# Patient Record
Sex: FEMALE | Race: WHITE | NOT HISPANIC OR LATINO | Employment: FULL TIME | URBAN - METROPOLITAN AREA
[De-identification: names, ages, dates, MRNs, and addresses within clinical notes are randomized per-mention and may not be internally consistent; named-entity substitution may affect disease eponyms.]

---

## 2021-04-09 ENCOUNTER — TELEPHONE (OUTPATIENT)
Dept: GASTROENTEROLOGY | Facility: CLINIC | Age: 61
End: 2021-04-09

## 2021-04-22 RX ORDER — SODIUM, POTASSIUM,MAG SULFATES 17.5-3.13G
2 SOLUTION, RECONSTITUTED, ORAL ORAL TAKE AS DIRECTED
Qty: 354 ML | Refills: 0 | Status: CANCELLED | OUTPATIENT
Start: 2021-04-22

## 2021-04-26 ENCOUNTER — TELEPHONE (OUTPATIENT)
Dept: GASTROENTEROLOGY | Facility: CLINIC | Age: 61
End: 2021-04-26

## 2021-04-26 ENCOUNTER — APPOINTMENT (OUTPATIENT)
Dept: PREADMISSION TESTING | Facility: HOSPITAL | Age: 61
End: 2021-04-26

## 2021-04-26 LAB — SARS-COV-2 RNA PNL SPEC NAA+PROBE: NOT DETECTED

## 2021-04-26 PROCEDURE — C9803 HOPD COVID-19 SPEC COLLECT: HCPCS

## 2021-04-26 PROCEDURE — U0004 COV-19 TEST NON-CDC HGH THRU: HCPCS

## 2021-04-26 NOTE — TELEPHONE ENCOUNTER
JE  MRS. PORRAS CALLED AND SAID SOMEONE CALLED RE; COLON WITH DR JETT  SHE HAD COVID TEST THIS MORNING. I CONFIRMED HER APPT WAS Thursday. IF SOMETHING HAS CHANGED PLEASE ALL PATIENT.

## 2021-04-29 ENCOUNTER — OUTSIDE FACILITY SERVICE (OUTPATIENT)
Dept: GASTROENTEROLOGY | Facility: CLINIC | Age: 61
End: 2021-04-29

## 2021-04-29 PROCEDURE — 45385 COLONOSCOPY W/LESION REMOVAL: CPT | Performed by: INTERNAL MEDICINE

## 2021-05-23 ENCOUNTER — APPOINTMENT (OUTPATIENT)
Dept: CT IMAGING | Facility: HOSPITAL | Age: 61
End: 2021-05-23

## 2021-05-23 ENCOUNTER — HOSPITAL ENCOUNTER (EMERGENCY)
Facility: HOSPITAL | Age: 61
Discharge: HOME | End: 2021-05-23
Attending: EMERGENCY MEDICINE | Admitting: EMERGENCY MEDICINE

## 2021-05-23 VITALS
HEART RATE: 86 BPM | OXYGEN SATURATION: 98 % | HEIGHT: 65 IN | SYSTOLIC BLOOD PRESSURE: 125 MMHG | WEIGHT: 120 LBS | TEMPERATURE: 98.5 F | BODY MASS INDEX: 19.99 KG/M2 | RESPIRATION RATE: 20 BRPM | DIASTOLIC BLOOD PRESSURE: 73 MMHG

## 2021-05-23 DIAGNOSIS — N12 PYELONEPHRITIS: ICD-10-CM

## 2021-05-23 DIAGNOSIS — R10.9 ACUTE ABDOMINAL PAIN: Primary | ICD-10-CM

## 2021-05-23 LAB
ALBUMIN SERPL-MCNC: 3.8 G/DL (ref 3.5–5.2)
ALBUMIN/GLOB SERPL: 1.2 G/DL
ALP SERPL-CCNC: 76 U/L (ref 39–117)
ALT SERPL W P-5'-P-CCNC: 31 U/L (ref 1–33)
ANION GAP SERPL CALCULATED.3IONS-SCNC: 11 MMOL/L (ref 5–15)
AST SERPL-CCNC: 22 U/L (ref 1–32)
BACTERIA UR QL AUTO: ABNORMAL /HPF
BASOPHILS # BLD AUTO: 0.03 10*3/MM3 (ref 0–0.2)
BASOPHILS NFR BLD AUTO: 0.3 % (ref 0–1.5)
BILIRUB SERPL-MCNC: 0.4 MG/DL (ref 0–1.2)
BILIRUB UR QL STRIP: NEGATIVE
BUN SERPL-MCNC: 10 MG/DL (ref 8–23)
BUN/CREAT SERPL: 14.1 (ref 7–25)
CALCIUM SPEC-SCNC: 9.9 MG/DL (ref 8.6–10.5)
CHLORIDE SERPL-SCNC: 94 MMOL/L (ref 98–107)
CLARITY UR: CLEAR
CO2 SERPL-SCNC: 28 MMOL/L (ref 22–29)
COLOR UR: YELLOW
CREAT SERPL-MCNC: 0.71 MG/DL (ref 0.57–1)
D-LACTATE SERPL-SCNC: 0.9 MMOL/L (ref 0.5–2)
DEPRECATED RDW RBC AUTO: 42.2 FL (ref 37–54)
EOSINOPHIL # BLD AUTO: 0.1 10*3/MM3 (ref 0–0.4)
EOSINOPHIL NFR BLD AUTO: 1.1 % (ref 0.3–6.2)
ERYTHROCYTE [DISTWIDTH] IN BLOOD BY AUTOMATED COUNT: 11.6 % (ref 12.3–15.4)
GFR SERPL CREATININE-BSD FRML MDRD: 84 ML/MIN/1.73
GLOBULIN UR ELPH-MCNC: 3.2 GM/DL
GLUCOSE SERPL-MCNC: 109 MG/DL (ref 65–99)
GLUCOSE UR STRIP-MCNC: NEGATIVE MG/DL
HCT VFR BLD AUTO: 32 % (ref 34–46.6)
HGB BLD-MCNC: 11.6 G/DL (ref 12–15.9)
HGB UR QL STRIP.AUTO: ABNORMAL
HOLD SPECIMEN: NORMAL
HYALINE CASTS UR QL AUTO: ABNORMAL /LPF
IMM GRANULOCYTES # BLD AUTO: 0.04 10*3/MM3 (ref 0–0.05)
IMM GRANULOCYTES NFR BLD AUTO: 0.4 % (ref 0–0.5)
KETONES UR QL STRIP: NEGATIVE
LEUKOCYTE ESTERASE UR QL STRIP.AUTO: ABNORMAL
LIPASE SERPL-CCNC: 48 U/L (ref 13–60)
LYMPHOCYTES # BLD AUTO: 0.76 10*3/MM3 (ref 0.7–3.1)
LYMPHOCYTES NFR BLD AUTO: 8.2 % (ref 19.6–45.3)
MCH RBC QN AUTO: 35.8 PG (ref 26.6–33)
MCHC RBC AUTO-ENTMCNC: 36.3 G/DL (ref 31.5–35.7)
MCV RBC AUTO: 98.8 FL (ref 79–97)
MONOCYTES # BLD AUTO: 0.8 10*3/MM3 (ref 0.1–0.9)
MONOCYTES NFR BLD AUTO: 8.6 % (ref 5–12)
NEUTROPHILS NFR BLD AUTO: 7.55 10*3/MM3 (ref 1.7–7)
NEUTROPHILS NFR BLD AUTO: 81.4 % (ref 42.7–76)
NITRITE UR QL STRIP: NEGATIVE
NRBC BLD AUTO-RTO: 0 /100 WBC (ref 0–0.2)
PH UR STRIP.AUTO: 7 [PH] (ref 5–8)
PLATELET # BLD AUTO: 201 10*3/MM3 (ref 140–450)
PMV BLD AUTO: 8.9 FL (ref 6–12)
POTASSIUM SERPL-SCNC: 4.1 MMOL/L (ref 3.5–5.2)
PROT SERPL-MCNC: 7 G/DL (ref 6–8.5)
PROT UR QL STRIP: NEGATIVE
RBC # BLD AUTO: 3.24 10*6/MM3 (ref 3.77–5.28)
RBC # UR: ABNORMAL /HPF
REF LAB TEST METHOD: ABNORMAL
SODIUM SERPL-SCNC: 133 MMOL/L (ref 136–145)
SP GR UR STRIP: 1.01 (ref 1–1.03)
SQUAMOUS #/AREA URNS HPF: ABNORMAL /HPF
UROBILINOGEN UR QL STRIP: ABNORMAL
WBC # BLD AUTO: 9.28 10*3/MM3 (ref 3.4–10.8)
WBC UR QL AUTO: ABNORMAL /HPF
WHOLE BLOOD HOLD SPECIMEN: NORMAL
WHOLE BLOOD HOLD SPECIMEN: NORMAL

## 2021-05-23 PROCEDURE — 25010000002 KETOROLAC TROMETHAMINE PER 15 MG: Performed by: EMERGENCY MEDICINE

## 2021-05-23 PROCEDURE — 81001 URINALYSIS AUTO W/SCOPE: CPT | Performed by: EMERGENCY MEDICINE

## 2021-05-23 PROCEDURE — 25010000002 CEFTRIAXONE PER 250 MG: Performed by: EMERGENCY MEDICINE

## 2021-05-23 PROCEDURE — 74176 CT ABD & PELVIS W/O CONTRAST: CPT

## 2021-05-23 PROCEDURE — 85025 COMPLETE CBC W/AUTO DIFF WBC: CPT

## 2021-05-23 PROCEDURE — 87186 SC STD MICRODIL/AGAR DIL: CPT | Performed by: EMERGENCY MEDICINE

## 2021-05-23 PROCEDURE — 87086 URINE CULTURE/COLONY COUNT: CPT | Performed by: EMERGENCY MEDICINE

## 2021-05-23 PROCEDURE — 87077 CULTURE AEROBIC IDENTIFY: CPT | Performed by: EMERGENCY MEDICINE

## 2021-05-23 PROCEDURE — 99283 EMERGENCY DEPT VISIT LOW MDM: CPT

## 2021-05-23 PROCEDURE — 83605 ASSAY OF LACTIC ACID: CPT | Performed by: EMERGENCY MEDICINE

## 2021-05-23 PROCEDURE — 80053 COMPREHEN METABOLIC PANEL: CPT | Performed by: EMERGENCY MEDICINE

## 2021-05-23 PROCEDURE — 25010000002 HYDROMORPHONE PER 4 MG: Performed by: EMERGENCY MEDICINE

## 2021-05-23 PROCEDURE — 25010000002 ONDANSETRON PER 1 MG: Performed by: EMERGENCY MEDICINE

## 2021-05-23 PROCEDURE — 83690 ASSAY OF LIPASE: CPT | Performed by: EMERGENCY MEDICINE

## 2021-05-23 PROCEDURE — 96365 THER/PROPH/DIAG IV INF INIT: CPT

## 2021-05-23 PROCEDURE — 96375 TX/PRO/DX INJ NEW DRUG ADDON: CPT

## 2021-05-23 RX ORDER — ASPIRIN 81 MG/1
81 TABLET, CHEWABLE ORAL DAILY
COMMUNITY

## 2021-05-23 RX ORDER — MULTIPLE VITAMINS W/ MINERALS TAB 9MG-400MCG
1 TAB ORAL DAILY
COMMUNITY

## 2021-05-23 RX ORDER — KETOROLAC TROMETHAMINE 15 MG/ML
15 INJECTION, SOLUTION INTRAMUSCULAR; INTRAVENOUS ONCE
Status: COMPLETED | OUTPATIENT
Start: 2021-05-23 | End: 2021-05-23

## 2021-05-23 RX ORDER — OXYCODONE HYDROCHLORIDE AND ACETAMINOPHEN 5; 325 MG/1; MG/1
1 TABLET ORAL EVERY 6 HOURS PRN
Qty: 8 TABLET | Refills: 0 | Status: SHIPPED | OUTPATIENT
Start: 2021-05-23

## 2021-05-23 RX ORDER — SODIUM CHLORIDE 9 MG/ML
10 INJECTION INTRAVENOUS AS NEEDED
Status: DISCONTINUED | OUTPATIENT
Start: 2021-05-23 | End: 2021-05-23 | Stop reason: HOSPADM

## 2021-05-23 RX ORDER — ONDANSETRON 2 MG/ML
4 INJECTION INTRAMUSCULAR; INTRAVENOUS ONCE
Status: COMPLETED | OUTPATIENT
Start: 2021-05-23 | End: 2021-05-23

## 2021-05-23 RX ORDER — HYDROMORPHONE HYDROCHLORIDE 1 MG/ML
0.5 INJECTION, SOLUTION INTRAMUSCULAR; INTRAVENOUS; SUBCUTANEOUS ONCE
Status: COMPLETED | OUTPATIENT
Start: 2021-05-23 | End: 2021-05-23

## 2021-05-23 RX ORDER — CEFUROXIME AXETIL 500 MG/1
500 TABLET ORAL 2 TIMES DAILY
Qty: 20 TABLET | Refills: 0 | Status: SHIPPED | OUTPATIENT
Start: 2021-05-23

## 2021-05-23 RX ORDER — CETIRIZINE HYDROCHLORIDE 10 MG/1
10 TABLET ORAL DAILY
COMMUNITY

## 2021-05-23 RX ADMIN — SODIUM CHLORIDE 1000 ML: 9 INJECTION, SOLUTION INTRAVENOUS at 13:57

## 2021-05-23 RX ADMIN — HYDROMORPHONE HYDROCHLORIDE 0.5 MG: 1 INJECTION, SOLUTION INTRAMUSCULAR; INTRAVENOUS; SUBCUTANEOUS at 13:59

## 2021-05-23 RX ADMIN — KETOROLAC TROMETHAMINE 15 MG: 15 INJECTION, SOLUTION INTRAMUSCULAR; INTRAVENOUS at 17:14

## 2021-05-23 RX ADMIN — ONDANSETRON 4 MG: 2 INJECTION INTRAMUSCULAR; INTRAVENOUS at 13:58

## 2021-05-23 RX ADMIN — SODIUM CHLORIDE 1 G: 900 INJECTION INTRAVENOUS at 16:35

## 2021-05-23 NOTE — DISCHARGE INSTRUCTIONS
No drive while taking the pain medicine I have prescribed.  Return if high fever, worsening pain, vomiting, or any other concerns.  Otherwise call your primary care provider tomorrow and arrange follow-up this week with her.  Drink extra fluids

## 2021-05-23 NOTE — ED PROVIDER NOTES
Subjective   Mrs. Godinez presents with low abdominal pain.  Is been present for the last 3 days.  It is constant.  It is worsened with sitting and certain movements.  She has decreased appetite.  She reports that her bowel movements have been abnormal with frequent loose stools.  She tells me this feels like prior episodes of diverticulitis.  This morning however the pain moved to her right lower quadrant.  She denies nausea or vomiting.  She has had chills but no fever.  She has had no prior abdominal surgeries.      History provided by:  Patient  Abdominal Pain  Pain location:  RLQ  Pain quality: dull    Pain radiates to:  L flank  Pain severity:  Moderate  Onset quality:  Gradual  Timing:  Constant  Progression:  Worsening  Chronicity:  New  Relieved by:  Nothing  Worsened by:  Movement  Associated symptoms: anorexia and nausea    Associated symptoms: no chest pain, no constipation, no cough, no dysuria, no fever, no hematuria, no shortness of breath and no vomiting        Review of Systems   Constitutional: Negative for fever.   HENT: Negative for congestion and rhinorrhea.    Respiratory: Negative for cough and shortness of breath.    Cardiovascular: Negative for chest pain.   Gastrointestinal: Positive for abdominal pain, anorexia and nausea. Negative for constipation and vomiting.   Genitourinary: Negative for dysuria and hematuria.   Musculoskeletal: Negative for back pain.   All other systems reviewed and are negative.      History reviewed. No pertinent past medical history.    No Known Allergies    Past Surgical History:   Procedure Laterality Date   • HYSTERECTOMY     • KNEE ARTHROSCOPY Bilateral        History reviewed. No pertinent family history.    Social History     Socioeconomic History   • Marital status:      Spouse name: Not on file   • Number of children: Not on file   • Years of education: Not on file   • Highest education level: Not on file   Tobacco Use   • Smoking status: Never  Smoker   • Smokeless tobacco: Never Used   Substance and Sexual Activity   • Alcohol use: Yes     Alcohol/week: 7.0 standard drinks     Types: 7 Glasses of wine per week   • Drug use: Never           Objective   Physical Exam  Vitals and nursing note reviewed.   Constitutional:       General: She is not in acute distress.     Appearance: She is well-developed.   HENT:      Head: Normocephalic and atraumatic.   Eyes:      General: No scleral icterus.  Cardiovascular:      Rate and Rhythm: Normal rate and regular rhythm.      Heart sounds: No murmur heard.   No friction rub.   Pulmonary:      Effort: Pulmonary effort is normal.      Breath sounds: Normal breath sounds. No wheezing or rales.   Abdominal:      General: Abdomen is flat. Bowel sounds are normal. There is no distension.      Palpations: Abdomen is soft.      Tenderness: There is abdominal tenderness in the right lower quadrant. There is no guarding or rebound.   Skin:     General: Skin is warm and dry.      Capillary Refill: Capillary refill takes less than 2 seconds.      Coloration: Skin is not pale.   Neurological:      General: No focal deficit present.      Mental Status: She is alert and oriented to person, place, and time.      Motor: No weakness.         Procedures           ED Course  ED Course as of May 23 2107   Sun May 23, 2021   1352 I discussed pain medication and she would like to have some.  She tells me she does fine with morphine.  I have ordered Dilaudid    [DT]   1657 Reviewed Mrs. Godinez's CAT scan as well as her labs.  This is most consistent with urinary tract infection and pyelonephritis.  Have given IV antibiotics.  Her  will drive her home.  We will give a dose of Toradol prior to discharge.  We will place her on antibiotics.  And prescribe a small amount of pain medication    [DT]      ED Course User Index  [DT] Ata Matamoros MD                                           MDM  Number of Diagnoses or Management  Options  Acute abdominal pain: new and requires workup  Pyelonephritis: new and requires workup     Amount and/or Complexity of Data Reviewed  Clinical lab tests: ordered and reviewed  Tests in the radiology section of CPT®: ordered and reviewed  Review and summarize past medical records: yes  Independent visualization of images, tracings, or specimens: yes    Patient Progress  Patient progress: improved      Final diagnoses:   Acute abdominal pain   Pyelonephritis       ED Disposition  ED Disposition     ED Disposition Condition Comment    Discharge Stable           Luly Morgan MD  [address] [facility]  , [address]  [place] [address]  [phone]               Medication List      New Prescriptions    cefuroxime 500 MG tablet  Commonly known as: CEFTIN  Take 1 tablet by mouth 2 (Two) Times a Day.     oxyCODONE-acetaminophen 5-325 MG per tablet  Commonly known as: PERCOCET  Take 1 tablet by mouth Every 6 (Six) Hours As Needed (Pain).        Stop    polyethylene glycol 236 g solution  Commonly known as: GoLYTELY           Where to Get Your Medications      These medications were sent to [id number] - [place], [place] - [address] AT [facility] - [phone] [facility] [phone] [place] [address] [address] [facility] [address]    Phone: [phone]   · cefuroxime 500 MG tablet  · oxyCODONE-acetaminophen 5-325 MG per tablet          Ata Matamoros MD  05/23/21 9126

## 2021-05-25 LAB — BACTERIA SPEC AEROBE CULT: ABNORMAL

## 2023-11-22 ENCOUNTER — APPOINTMENT (OUTPATIENT)
Dept: CT IMAGING | Facility: HOSPITAL | Age: 63
End: 2023-11-22
Payer: COMMERCIAL

## 2023-11-22 ENCOUNTER — APPOINTMENT (OUTPATIENT)
Dept: MRI IMAGING | Facility: HOSPITAL | Age: 63
End: 2023-11-22
Payer: COMMERCIAL

## 2023-11-22 ENCOUNTER — HOSPITAL ENCOUNTER (OUTPATIENT)
Facility: HOSPITAL | Age: 63
Setting detail: OBSERVATION
Discharge: HOME | End: 2023-11-23
Attending: EMERGENCY MEDICINE | Admitting: INTERNAL MEDICINE
Payer: COMMERCIAL

## 2023-11-22 ENCOUNTER — APPOINTMENT (OUTPATIENT)
Dept: GENERAL RADIOLOGY | Facility: HOSPITAL | Age: 63
End: 2023-11-22
Payer: COMMERCIAL

## 2023-11-22 DIAGNOSIS — R41.82 ALTERED MENTAL STATUS, UNSPECIFIED ALTERED MENTAL STATUS TYPE: Primary | ICD-10-CM

## 2023-11-22 PROBLEM — R79.89 ELEVATED LACTIC ACID LEVEL: Status: ACTIVE | Noted: 2023-11-22

## 2023-11-22 PROBLEM — F41.8 ANXIETY ASSOCIATED WITH DEPRESSION: Chronic | Status: ACTIVE | Noted: 2023-11-22

## 2023-11-22 PROBLEM — R56.9 OBSERVED SEIZURE-LIKE ACTIVITY: Status: ACTIVE | Noted: 2023-11-22

## 2023-11-22 PROBLEM — E78.5 HLD (HYPERLIPIDEMIA): Chronic | Status: ACTIVE | Noted: 2023-11-22

## 2023-11-22 PROBLEM — I10 PRIMARY HYPERTENSION: Chronic | Status: ACTIVE | Noted: 2023-11-22

## 2023-11-22 PROBLEM — J30.2 SEASONAL ALLERGIC RHINITIS: Status: ACTIVE | Noted: 2023-08-18

## 2023-11-22 PROBLEM — K57.92 DIVERTICULITIS: Status: ACTIVE | Noted: 2023-08-18

## 2023-11-22 PROBLEM — E03.9 HYPOTHYROIDISM (ACQUIRED): Chronic | Status: ACTIVE | Noted: 2023-11-22

## 2023-11-22 PROBLEM — N17.9 AKI (ACUTE KIDNEY INJURY): Status: ACTIVE | Noted: 2023-11-22

## 2023-11-22 LAB
ALBUMIN SERPL-MCNC: 4.7 G/DL (ref 3.5–5.2)
ALBUMIN/GLOB SERPL: 2 G/DL
ALP SERPL-CCNC: 48 U/L (ref 39–117)
ALT SERPL W P-5'-P-CCNC: 85 U/L (ref 1–33)
AMPHET+METHAMPHET UR QL: NEGATIVE
AMPHETAMINES UR QL: NEGATIVE
ANION GAP SERPL CALCULATED.3IONS-SCNC: 13 MMOL/L (ref 5–15)
AST SERPL-CCNC: 85 U/L (ref 1–32)
B PARAPERT DNA SPEC QL NAA+PROBE: NOT DETECTED
B PERT DNA SPEC QL NAA+PROBE: NOT DETECTED
BARBITURATES UR QL SCN: NEGATIVE
BASOPHILS # BLD AUTO: 0.04 10*3/MM3 (ref 0–0.2)
BASOPHILS NFR BLD AUTO: 0.7 % (ref 0–1.5)
BENZODIAZ UR QL SCN: NEGATIVE
BILIRUB SERPL-MCNC: 0.5 MG/DL (ref 0–1.2)
BILIRUB UR QL STRIP: NEGATIVE
BUN SERPL-MCNC: 16 MG/DL (ref 8–23)
BUN/CREAT SERPL: 14.5 (ref 7–25)
BUPRENORPHINE SERPL-MCNC: NEGATIVE NG/ML
C PNEUM DNA NPH QL NAA+NON-PROBE: NOT DETECTED
CALCIUM SPEC-SCNC: 10.7 MG/DL (ref 8.6–10.5)
CANNABINOIDS SERPL QL: NEGATIVE
CHLORIDE SERPL-SCNC: 95 MMOL/L (ref 98–107)
CLARITY UR: ABNORMAL
CO2 SERPL-SCNC: 28 MMOL/L (ref 22–29)
COCAINE UR QL: NEGATIVE
COLOR UR: YELLOW
CREAT SERPL-MCNC: 1.1 MG/DL (ref 0.57–1)
D-LACTATE SERPL-SCNC: 1.6 MMOL/L (ref 0.5–2)
D-LACTATE SERPL-SCNC: 4.4 MMOL/L (ref 0.5–2)
DEPRECATED RDW RBC AUTO: 45.7 FL (ref 37–54)
EGFRCR SERPLBLD CKD-EPI 2021: 56.2 ML/MIN/1.73
EOSINOPHIL # BLD AUTO: 0.19 10*3/MM3 (ref 0–0.4)
EOSINOPHIL NFR BLD AUTO: 3.3 % (ref 0.3–6.2)
ERYTHROCYTE [DISTWIDTH] IN BLOOD BY AUTOMATED COUNT: 12 % (ref 12.3–15.4)
ETHANOL BLD-MCNC: <10 MG/DL (ref 0–10)
FENTANYL UR-MCNC: NEGATIVE NG/ML
FLUAV SUBTYP SPEC NAA+PROBE: NOT DETECTED
FLUBV RNA ISLT QL NAA+PROBE: NOT DETECTED
GEN 5 2HR TROPONIN T REFLEX: <6 NG/L
GLOBULIN UR ELPH-MCNC: 2.4 GM/DL
GLUCOSE SERPL-MCNC: 176 MG/DL (ref 65–99)
GLUCOSE UR STRIP-MCNC: ABNORMAL MG/DL
HADV DNA SPEC NAA+PROBE: NOT DETECTED
HCOV 229E RNA SPEC QL NAA+PROBE: NOT DETECTED
HCOV HKU1 RNA SPEC QL NAA+PROBE: NOT DETECTED
HCOV NL63 RNA SPEC QL NAA+PROBE: NOT DETECTED
HCOV OC43 RNA SPEC QL NAA+PROBE: NOT DETECTED
HCT VFR BLD AUTO: 33.7 % (ref 34–46.6)
HGB BLD-MCNC: 12.1 G/DL (ref 12–15.9)
HGB UR QL STRIP.AUTO: NEGATIVE
HMPV RNA NPH QL NAA+NON-PROBE: NOT DETECTED
HPIV1 RNA ISLT QL NAA+PROBE: NOT DETECTED
HPIV2 RNA SPEC QL NAA+PROBE: NOT DETECTED
HPIV3 RNA NPH QL NAA+PROBE: NOT DETECTED
HPIV4 P GENE NPH QL NAA+PROBE: NOT DETECTED
IMM GRANULOCYTES # BLD AUTO: 0.03 10*3/MM3 (ref 0–0.05)
IMM GRANULOCYTES NFR BLD AUTO: 0.5 % (ref 0–0.5)
KETONES UR QL STRIP: NEGATIVE
LEUKOCYTE ESTERASE UR QL STRIP.AUTO: NEGATIVE
LIPASE SERPL-CCNC: 59 U/L (ref 13–60)
LYMPHOCYTES # BLD AUTO: 0.57 10*3/MM3 (ref 0.7–3.1)
LYMPHOCYTES NFR BLD AUTO: 10 % (ref 19.6–45.3)
M PNEUMO IGG SER IA-ACNC: NOT DETECTED
MCH RBC QN AUTO: 37.5 PG (ref 26.6–33)
MCHC RBC AUTO-ENTMCNC: 35.9 G/DL (ref 31.5–35.7)
MCV RBC AUTO: 104.3 FL (ref 79–97)
METHADONE UR QL SCN: NEGATIVE
MONOCYTES # BLD AUTO: 0.29 10*3/MM3 (ref 0.1–0.9)
MONOCYTES NFR BLD AUTO: 5.1 % (ref 5–12)
NEUTROPHILS NFR BLD AUTO: 4.56 10*3/MM3 (ref 1.7–7)
NEUTROPHILS NFR BLD AUTO: 80.4 % (ref 42.7–76)
NITRITE UR QL STRIP: NEGATIVE
NRBC BLD AUTO-RTO: 0 /100 WBC (ref 0–0.2)
OPIATES UR QL: NEGATIVE
OXYCODONE UR QL SCN: NEGATIVE
PCP UR QL SCN: NEGATIVE
PH UR STRIP.AUTO: 6.5 [PH] (ref 5–8)
PLATELET # BLD AUTO: 200 10*3/MM3 (ref 140–450)
PMV BLD AUTO: 9.3 FL (ref 6–12)
POTASSIUM SERPL-SCNC: 3.8 MMOL/L (ref 3.5–5.2)
PROT SERPL-MCNC: 7.1 G/DL (ref 6–8.5)
PROT UR QL STRIP: NEGATIVE
RBC # BLD AUTO: 3.23 10*6/MM3 (ref 3.77–5.28)
RHINOVIRUS RNA SPEC NAA+PROBE: NOT DETECTED
RSV RNA NPH QL NAA+NON-PROBE: NOT DETECTED
SARS-COV-2 RNA NPH QL NAA+NON-PROBE: NOT DETECTED
SODIUM SERPL-SCNC: 136 MMOL/L (ref 136–145)
SP GR UR STRIP: 1.02 (ref 1–1.03)
TRICYCLICS UR QL SCN: NEGATIVE
TROPONIN T DELTA: NORMAL
TROPONIN T SERPL HS-MCNC: <6 NG/L
TSH SERPL DL<=0.05 MIU/L-ACNC: 1.16 UIU/ML (ref 0.27–4.2)
UROBILINOGEN UR QL STRIP: ABNORMAL
WBC NRBC COR # BLD AUTO: 5.68 10*3/MM3 (ref 3.4–10.8)

## 2023-11-22 PROCEDURE — A9577 INJ MULTIHANCE: HCPCS | Performed by: INTERNAL MEDICINE

## 2023-11-22 PROCEDURE — 25810000003 SODIUM CHLORIDE 0.9 % SOLUTION: Performed by: NURSE PRACTITIONER

## 2023-11-22 PROCEDURE — 83690 ASSAY OF LIPASE: CPT | Performed by: EMERGENCY MEDICINE

## 2023-11-22 PROCEDURE — G0378 HOSPITAL OBSERVATION PER HR: HCPCS

## 2023-11-22 PROCEDURE — 93005 ELECTROCARDIOGRAM TRACING: CPT | Performed by: EMERGENCY MEDICINE

## 2023-11-22 PROCEDURE — 83605 ASSAY OF LACTIC ACID: CPT | Performed by: EMERGENCY MEDICINE

## 2023-11-22 PROCEDURE — 82077 ASSAY SPEC XCP UR&BREATH IA: CPT | Performed by: EMERGENCY MEDICINE

## 2023-11-22 PROCEDURE — 84484 ASSAY OF TROPONIN QUANT: CPT | Performed by: EMERGENCY MEDICINE

## 2023-11-22 PROCEDURE — 99221 1ST HOSP IP/OBS SF/LOW 40: CPT | Performed by: INTERNAL MEDICINE

## 2023-11-22 PROCEDURE — 0 GADOBENATE DIMEGLUMINE 529 MG/ML SOLUTION: Performed by: INTERNAL MEDICINE

## 2023-11-22 PROCEDURE — 70450 CT HEAD/BRAIN W/O DYE: CPT

## 2023-11-22 PROCEDURE — 70553 MRI BRAIN STEM W/O & W/DYE: CPT

## 2023-11-22 PROCEDURE — 0202U NFCT DS 22 TRGT SARS-COV-2: CPT | Performed by: EMERGENCY MEDICINE

## 2023-11-22 PROCEDURE — 80307 DRUG TEST PRSMV CHEM ANLYZR: CPT | Performed by: EMERGENCY MEDICINE

## 2023-11-22 PROCEDURE — 80050 GENERAL HEALTH PANEL: CPT | Performed by: EMERGENCY MEDICINE

## 2023-11-22 PROCEDURE — 81003 URINALYSIS AUTO W/O SCOPE: CPT | Performed by: EMERGENCY MEDICINE

## 2023-11-22 PROCEDURE — 96361 HYDRATE IV INFUSION ADD-ON: CPT

## 2023-11-22 PROCEDURE — 71045 X-RAY EXAM CHEST 1 VIEW: CPT

## 2023-11-22 PROCEDURE — 25810000003 SODIUM CHLORIDE 0.9 % SOLUTION: Performed by: EMERGENCY MEDICINE

## 2023-11-22 PROCEDURE — 36415 COLL VENOUS BLD VENIPUNCTURE: CPT

## 2023-11-22 PROCEDURE — 99284 EMERGENCY DEPT VISIT MOD MDM: CPT

## 2023-11-22 RX ORDER — SODIUM CHLORIDE 0.9 % (FLUSH) 0.9 %
10 SYRINGE (ML) INJECTION EVERY 12 HOURS SCHEDULED
Status: DISCONTINUED | OUTPATIENT
Start: 2023-11-22 | End: 2023-11-23 | Stop reason: HOSPADM

## 2023-11-22 RX ORDER — POLYETHYLENE GLYCOL 3350 17 G/17G
17 POWDER, FOR SOLUTION ORAL DAILY PRN
Status: DISCONTINUED | OUTPATIENT
Start: 2023-11-22 | End: 2023-11-23 | Stop reason: HOSPADM

## 2023-11-22 RX ORDER — SODIUM CHLORIDE 9 MG/ML
40 INJECTION, SOLUTION INTRAVENOUS AS NEEDED
Status: DISCONTINUED | OUTPATIENT
Start: 2023-11-22 | End: 2023-11-23 | Stop reason: HOSPADM

## 2023-11-22 RX ORDER — MIDAZOLAM HYDROCHLORIDE 1 MG/ML
2.5 INJECTION INTRAMUSCULAR; INTRAVENOUS EVERY 4 HOURS PRN
Status: DISCONTINUED | OUTPATIENT
Start: 2023-11-22 | End: 2023-11-23 | Stop reason: HOSPADM

## 2023-11-22 RX ORDER — SODIUM CHLORIDE 0.9 % (FLUSH) 0.9 %
10 SYRINGE (ML) INJECTION AS NEEDED
Status: DISCONTINUED | OUTPATIENT
Start: 2023-11-22 | End: 2023-11-23 | Stop reason: HOSPADM

## 2023-11-22 RX ORDER — NICOTINE 21 MG/24HR
1 PATCH, TRANSDERMAL 24 HOURS TRANSDERMAL
Status: DISCONTINUED | OUTPATIENT
Start: 2023-11-22 | End: 2023-11-23 | Stop reason: HOSPADM

## 2023-11-22 RX ORDER — ACETAMINOPHEN 325 MG/1
650 TABLET ORAL EVERY 4 HOURS PRN
Status: DISCONTINUED | OUTPATIENT
Start: 2023-11-22 | End: 2023-11-23 | Stop reason: HOSPADM

## 2023-11-22 RX ORDER — ACETAMINOPHEN 650 MG/1
650 SUPPOSITORY RECTAL EVERY 4 HOURS PRN
Status: DISCONTINUED | OUTPATIENT
Start: 2023-11-22 | End: 2023-11-23 | Stop reason: HOSPADM

## 2023-11-22 RX ORDER — BISACODYL 5 MG/1
5 TABLET, DELAYED RELEASE ORAL DAILY PRN
Status: DISCONTINUED | OUTPATIENT
Start: 2023-11-22 | End: 2023-11-23 | Stop reason: HOSPADM

## 2023-11-22 RX ORDER — ASPIRIN 81 MG/1
81 TABLET, CHEWABLE ORAL DAILY
Status: DISCONTINUED | OUTPATIENT
Start: 2023-11-23 | End: 2023-11-23 | Stop reason: HOSPADM

## 2023-11-22 RX ORDER — BISACODYL 10 MG
10 SUPPOSITORY, RECTAL RECTAL DAILY PRN
Status: DISCONTINUED | OUTPATIENT
Start: 2023-11-22 | End: 2023-11-23 | Stop reason: HOSPADM

## 2023-11-22 RX ORDER — MULTIPLE VITAMINS W/ MINERALS TAB 9MG-400MCG
1 TAB ORAL DAILY
Status: DISCONTINUED | OUTPATIENT
Start: 2023-11-23 | End: 2023-11-23 | Stop reason: HOSPADM

## 2023-11-22 RX ORDER — SODIUM CHLORIDE 9 MG/ML
75 INJECTION, SOLUTION INTRAVENOUS CONTINUOUS
Status: ACTIVE | OUTPATIENT
Start: 2023-11-22 | End: 2023-11-23

## 2023-11-22 RX ORDER — ACETAMINOPHEN 160 MG/5ML
650 SOLUTION ORAL EVERY 4 HOURS PRN
Status: DISCONTINUED | OUTPATIENT
Start: 2023-11-22 | End: 2023-11-23 | Stop reason: HOSPADM

## 2023-11-22 RX ORDER — AMOXICILLIN 250 MG
1 CAPSULE ORAL 2 TIMES DAILY
Status: DISCONTINUED | OUTPATIENT
Start: 2023-11-22 | End: 2023-11-23 | Stop reason: HOSPADM

## 2023-11-22 RX ADMIN — SODIUM CHLORIDE 1000 ML: 9 INJECTION, SOLUTION INTRAVENOUS at 15:12

## 2023-11-22 RX ADMIN — Medication 1 PATCH: at 18:29

## 2023-11-22 RX ADMIN — ACETAMINOPHEN 650 MG: 325 TABLET ORAL at 22:30

## 2023-11-22 RX ADMIN — SODIUM CHLORIDE 75 ML/HR: 9 INJECTION, SOLUTION INTRAVENOUS at 18:31

## 2023-11-22 RX ADMIN — ACETAMINOPHEN 650 MG: 325 TABLET ORAL at 18:29

## 2023-11-22 RX ADMIN — GADOBENATE DIMEGLUMINE 12 ML: 529 INJECTION, SOLUTION INTRAVENOUS at 22:18

## 2023-11-22 NOTE — Clinical Note
Level of Care: Telemetry [5]   Diagnosis: AMS (altered mental status) [2155337]   Admitting Physician: KALA NICHOLSON [[id number]]   Attending Physician: KALA NICHOLSON [[id number]]

## 2023-11-22 NOTE — H&P
"    [facility] Medicine Services  HISTORY AND PHYSICAL    Patient Name: Mirna Godinez  : 1960  MRN: [id number]  Primary Care Physician: Lizett Anguiano APRN  Date of admission: 2023    Subjective   Subjective     Chief Complaint:  AMS, seizure like activity     HPI:  Mirna Godinez is a 63 y.o. female with past medical history significant for hypothyroidism, HTN, anxiety, depression.  Patient has been in normal state of health.  Was at work today sitting at her desk.  Approximately 1710 PM states she felt nauseated and then suddenly felt like a \"switch flipped\". Had a witnessed episode lasting 1-2 minutes of staring, \"shaking\", and questionable foaming at the mouth.  EMS notified.  Patient has no recollection of the events until ER.  Shortly oriented x 3 but slow to respond.  Proved and now simply reports feeling sleepy.  Initial lactate of 4.4, reflex 1.6.  CT head with no acute finding.  Mild leo.  ER spoke with neurology.  Dr. Tripathi recommends no AE medications for now, seizure precautions, MRI brain with and without and EEG.  Will admit to hospital medicine service    Review of Systems   Constitutional:  Positive for activity change and fatigue. Negative for fever.   HENT: Negative.     Eyes: Negative.    Respiratory: Negative.     Cardiovascular: Negative.    Gastrointestinal: Negative.    Genitourinary: Negative.    Musculoskeletal: Negative.    Skin: Negative.    Allergic/Immunologic: Negative.    Neurological:  Positive for tremors, seizures and speech difficulty.        Seizure like activity    Hematological: Negative.    Psychiatric/Behavioral:  The patient is nervous/anxious.           Personal History     Past Medical History:   Diagnosis Date    Anxiety associated with depression 2023    HLD (hyperlipidemia) 2023    Hypothyroidism (acquired) 2023    Primary hypertension 2023         Past Surgical History:   Procedure Laterality Date    " HYSTERECTOMY      KNEE ARTHROSCOPY Bilateral        Family History:  family history includes Bipolar disorder in her mother; Diabetes in her father; Heart disease in her father; No Known Problems in her daughter.     Social History:  reports that she quit smoking about 2 years ago. Her smoking use included cigarettes. She has never used smokeless tobacco. She reports current alcohol use of about 7.0 standard drinks of alcohol per week. She reports that she does not use drugs.  Social History     Social History Narrative    .  Lives with . No hh, oxygen or dme use.     Reports frequent not daily alcohol use, but it depends on the day.    Medications:  Lisinopril, PARoxetine HCl, aspirin, cetirizine, multivitamin with minerals, and oxyCODONE-acetaminophen    Allergies   Allergen Reactions    Ceclor [Cefaclor] Hives       Objective   Objective     Vital Signs:   Temp:  [98.3 °F (36.8 °C)-98.7 °F (37.1 °C)] 98.3 °F (36.8 °C)  Heart Rate:  [] 93  Resp:  [18] 18  BP: (131-159)/(64-91) 145/84  Flow (L/min):  [1] 1    Physical Exam   Constitutional: Awake, alert.  No acute distress.  Resting up in bed in ER.   at bedside.  Eyes: PERRLA, sclerae anicteric, no conjunctival injection  HENT: NCAT, mucous membranes moist  Neck: Supple, no thyromegaly, no lymphadenopathy, trachea midline  Respiratory: Clear to auscultation bilaterally, nonlabored respirations room air with sats 100%.  Cardiovascular: RRR, no murmurs, rubs, or gallops, palpable pedal pulses bilaterally  Gastrointestinal: Positive bowel sounds, soft, nontender, nondistended  Musculoskeletal: No bilateral ankle edema, no clubbing or cyanosis to extremities.  Dailey spontaneously.  Psychiatric: Appropriate affect, cooperative  Neurologic: Oriented x 3, strength symmetric in all extremities, Cranial Nerves grossly intact to confrontation, speech clear.  Follows commands.  Skin: No rashes      Result Review:  I have personally reviewed the  results from the time of this admission to 11/22/2023 23:46 EST and agree with these findings:  [x]  Laboratory list / accordion  []  Microbiology  [x]  Radiology  [x]  EKG/Telemetry   []  Cardiology/Vascular   []  Pathology  [x]  Old records  []  Other:  Most notable findings include: CT has calcified corpus collosum anteriorly and fair amount of atrophy.    LAB RESULTS:      Lab 11/22/23  1726 11/22/23  1450   WBC  --  5.68   HEMOGLOBIN  --  12.1   HEMATOCRIT  --  33.7*   PLATELETS  --  200   NEUTROS ABS  --  4.56   IMMATURE GRANS (ABS)  --  0.03   LYMPHS ABS  --  0.57*   MONOS ABS  --  0.29   EOS ABS  --  0.19   MCV  --  104.3*   LACTATE 1.6 4.4*         Lab 11/22/23  1726 11/22/23  1450   SODIUM  --  136   POTASSIUM  --  3.8   CHLORIDE  --  95*   CO2  --  28.0   ANION GAP  --  13.0   BUN  --  16   CREATININE  --  1.10*   EGFR  --  56.2*   GLUCOSE  --  176*   CALCIUM  --  10.7*   TSH 1.160  --          Lab 11/22/23  1450   TOTAL PROTEIN 7.1   ALBUMIN 4.7   GLOBULIN 2.4   ALT (SGPT) 85*   AST (SGOT) 85*   BILIRUBIN 0.5   ALK PHOS 48   LIPASE 59         Lab 11/22/23  1726 11/22/23  1450   HSTROP T <6 <6                 Brief Urine Lab Results  (Last result in the past 365 days)        Color   Clarity   Blood   Leuk Est   Nitrite   Protein   CREAT   Urine HCG        11/22/23 1624 Yellow   Hazy   Negative   Negative   Negative   Negative                 Microbiology Results (last 10 days)       Procedure Component Value - Date/Time    COVID PRE-OP / PRE-PROCEDURE SCREENING ORDER (NO ISOLATION) - Swab, Nasopharynx [[id number]]  (Normal) Collected: 11/22/23 1439    Lab Status: Final result Specimen: Swab from Nasopharynx Updated: 11/22/23 1546    Narrative:      The following orders were created for panel order COVID PRE-OP / PRE-PROCEDURE SCREENING ORDER (NO ISOLATION) - Swab, Nasopharynx.  Procedure                               Abnormality         Status                     ---------                                -----------         ------                     Respiratory Panel PCR w/...[[id number]]  Normal              Final result                 Please view results for these tests on the individual orders.    Respiratory Panel PCR w/COVID-19(SARS-CoV-2) TRAE/GORDO/JANIE/PAD/COR/VANNESSA In-House, NP Swab in UTM/VTM, 2 HR TAT - Swab, Nasopharynx [[id number]]  (Normal) Collected: 11/22/23 1439    Lab Status: Final result Specimen: Swab from Nasopharynx Updated: 11/22/23 3004     ADENOVIRUS, PCR Not Detected     Coronavirus 229E Not Detected     Coronavirus HKU1 Not Detected     Coronavirus NL63 Not Detected     Coronavirus OC43 Not Detected     COVID19 Not Detected     Human Metapneumovirus Not Detected     Human Rhinovirus/Enterovirus Not Detected     Influenza A PCR Not Detected     Influenza B PCR Not Detected     Parainfluenza Virus 1 Not Detected     Parainfluenza Virus 2 Not Detected     Parainfluenza Virus 3 Not Detected     Parainfluenza Virus 4 Not Detected     RSV, PCR Not Detected     Bordetella pertussis pcr Not Detected     Bordetella parapertussis PCR Not Detected     Chlamydophila pneumoniae PCR Not Detected     Mycoplasma pneumo by PCR Not Detected    Narrative:      In the setting of a positive respiratory panel with a viral infection PLUS a negative procalcitonin without other underlying concern for bacterial infection, consider observing off antibiotics or discontinuation of antibiotics and continue supportive care. If the respiratory panel is positive for atypical bacterial infection (Bordetella pertussis, Chlamydophila pneumoniae, or Mycoplasma pneumoniae), consider antibiotic de-escalation to target atypical bacterial infection.            MRI Brain With & Without Contrast    Result Date: 11/22/2023  MRI BRAIN W WO CONTRAST Date of Exam: 11/22/2023 10:17 PM EST Indication: AMS, seizure like activity.  Comparison: CT head 11/22/2023 Technique:  Routine multiplanar/multisequence sequence images of the brain were  obtained before and after the uneventful administration of 12 cc Multihance. Findings: No acute infarct. There is no evidence of hemorrhage. No mass effect, edema or midline shift No evidence of mesial temporal sclerosis. No evidence of gray-white matter heterotopia. No abnormal intracranial enhancement. Scattered periventricular and subcortical T2/FLAIR hyperintensities, nonspecific but most likely represents chronic small vessel ischemic changes. No extra-axial fluid collection. Partially empty and mildly expanded sella, nonspecific. Prominent ventricular system secondary to chronic parenchymal volume loss. Status post bilateral lens extraction. Otherwise the orbits are unremarkable. Mild mucosal thickening of the maxillary sinuses and ethmoid sinuses. The remainder of the paranasal sinuses and mastoid air cells are clear. Decreased volume of right maxillary sinus. The visualized soft tissues are unremarkable. No acute osseous abnormality.     Impression: Impression: No acute intracranial abnormality. No MRI findings to suggest an underlying etiology for patient's symptoms. Scattered periventricular and subcortical T2/FLAIR hyperintensities, nonspecific but most likely represents chronic small vessel ischemic changes. Partially empty and mildly expanded sella, nonspecific. No other findings to suggest underlying intracranial hypertension. Electronically Signed: Andrea Dennis DO  11/22/2023 10:46 PM EST  Workstation ID: [id number]    CT Head Without Contrast    Result Date: 11/22/2023  CT HEAD WO CONTRAST Date of Exam: 11/22/2023 3:33 PM EST Indication: confusion/seizure. Comparison: None available. Technique: Axial CT images were obtained of the head without contrast administration.  Automated exposure control and iterative construction methods were used. FINDINGS: Gray-white differentiation is maintained and there is no evidence of intracranial hemorrhage, mass or mass effect. Age-related changes of the brain  "are present including volume loss and typical periventricular sequela of chronic small vessel ischemia. There is otherwise no evidence of intracranial hemorrhage, mass or mass effect. The ventricles are normal in size and configuration accounting for surrounding volume loss. The orbits are normal and the paranasal sinuses demonstrate changes of chronic right maxillary sinusitis.     Impression: Age-related changes of the brain as above, otherwise without evidence of acute intracranial abnormality. Electronically Signed: Abel Sánchez MD  11/22/2023 3:42 PM EST  Workstation ID: [id number]    XR Chest 1 View    Result Date: 11/22/2023  XR CHEST 1 VW Date of Exam: 11/22/2023 2:26 PM EST Indication: altered mental status Comparison: 5/12/2016. Findings: Heart and pulmonary vessels appear within normal limits for technique. There is a somewhat poor inspiration with crowding of vascular markings. No acute lung infiltrates or effusions are present.     Impression: Impression: No acute process. Electronically Signed: Libra Quinn MD  11/22/2023 2:37 PM EST  Workstation ID: [id number]         Assessment & Plan   Assessment & Plan       AMS (altered mental status)    Observed seizure-like activity    Hypothyroidism (acquired)    Anxiety associated with depression    Primary hypertension    HLD (hyperlipidemia)    Elevated lactic acid level    SURI (acute kidney injury)    Mirna Godinez is a 63 y.o. female with past medical history significant for hypothyroidism, HTN, anxiety, depression.  Patient has been in normal state of health.  Was at work today sitting at her desk.  Approximately 1710 PM states she felt nauseated and then suddenly felt like a \"switch flipped\". Had a witnessed episode lasting 1-2 minutes of staring, \"shaking\", and questionable foaming at the mouth.  EMS notified.  Patient has no recollection of the events until ER.  Shortly oriented x 3 but slow to respond.  Proved and now simply reports feeling sleepy.  " "Initial lactate of 4.4, reflex 1.6.  CT head with no acute finding.  Mild suri.  ER spoke with neurology.  Dr. Tripathi recommends no AE medications for now, seizure precautions, MRI brain with and without and EEG.  Will admit to hospital medicine service    Assessment/Plan:    AMS   seizure like activity/\"Spell\"  --Wittnessed seizure-like activity lasting 1-2 minutes at 1325 today.  Primarily staring, then shaking, nonverbal, questionable foaming at the mouth.  -- Noted slightly slowed response on arrival but oriented x 3.  Resolved.  --CT head with no acute findings  --Respiratory viral panel negative.  --Lactate 4.4, reflex down to 1.6  -- ER spoke with Dr. Tripathi with neuro  -- Recs for MRI with and without of the brain and EEG.  Ordered.   -- Consulted neurology  -- Seizure precautions  -- Monitor labs  --- consider alcohol withdrawal/irritability     Mild SURI  --Likely prerenal.   --Check a.m. labs    Hypothyroidism  --Takes Synthroid.  -- Check TSH    Anxiety/depression  -- Takes paroxetine.       DVT prophylaxis:  sequentials    CODE STATUS:    Level Of Support Discussed With: Patient  Code Status (Patient has no pulse and is not breathing): CPR (Attempt to Resuscitate)  Medical Interventions (Patient has pulse or is breathing): Full Support      Expected Discharge  Expected Discharge Date: 11/23/2023; Expected Discharge Time:       This note has been completed as part of a split-shared workflow.     Signature: Electronically signed by AASHISH Bassett, 11/22/23, 7:12 PM EST    Convincing story for seizure.  On exam alert but [place] and poor vision, slow to respond to some questions, some resting tremor.  Start CIWA, look forward to neurology review of her radiology and EEG studies.  Start thiamine.  Kellie Mishra MD 11/22/23 23:47 EST             "

## 2023-11-22 NOTE — ED PROVIDER NOTES
Subjective   History of Present Illness  63-year-old female brought in for by EMS for evaluation of possible seizure and altered mental status.  The patient reportedly was at work whenever she had an episode where she was unresponsive and had shaking activity for roughly 1 minute.  This resolved on its own without any intervention.  She is awake and alert upon arrival here but she does admit to being confused.  It takes her some time to answer orientation questions but she is oriented to person, place, and mostly at the time.  She knew that it was November 2023 and that Thanksgiving was 1 day away.  She denies fever, bodies, or chills.  She denies trauma or injury to her head or neck.  She denies medication adjustment.  She denies illicit drug use or any significant daily alcohol use.  No focal weakness to the face, arms, legs.  No other acute complaints.      Review of Systems   Constitutional:  Positive for fatigue. Negative for chills and fever.   HENT:  Negative for congestion, ear pain, postnasal drip, sinus pressure and sore throat.    Eyes:  Negative for pain, redness and visual disturbance.   Respiratory:  Negative for cough, chest tightness and shortness of breath.    Cardiovascular:  Negative for chest pain, palpitations and leg swelling.   Gastrointestinal:  Negative for abdominal pain, anal bleeding, blood in stool, diarrhea, nausea and vomiting.   Endocrine: Negative for polydipsia and polyuria.   Genitourinary:  Negative for difficulty urinating, dysuria, frequency and urgency.   Musculoskeletal:  Negative for arthralgias, back pain and neck pain.   Skin:  Negative for pallor and rash.   Allergic/Immunologic: Negative for environmental allergies and immunocompromised state.   Neurological:  Positive for seizures. Negative for dizziness, weakness and headaches.   Hematological:  Negative for adenopathy.   Psychiatric/Behavioral:  Positive for confusion and decreased concentration. Negative for  self-injury and suicidal ideas. The patient is not nervous/anxious.    All other systems reviewed and are negative.      History reviewed. No pertinent past medical history.    Allergies   Allergen Reactions    Ceclor [Cefaclor] Hives       Past Surgical History:   Procedure Laterality Date    HYSTERECTOMY      KNEE ARTHROSCOPY Bilateral        History reviewed. No pertinent family history.    Social History     Socioeconomic History    Marital status:    Tobacco Use    Smoking status: Never    Smokeless tobacco: Never   Substance and Sexual Activity    Alcohol use: Yes     Alcohol/week: 7.0 standard drinks of alcohol     Types: 7 Glasses of wine per week    Drug use: Never           Objective   Physical Exam  Vitals and nursing note reviewed.   Constitutional:       General: She is not in acute distress.     Appearance: Normal appearance. She is well-developed. She is not toxic-appearing or diaphoretic.   HENT:      Head: Normocephalic and atraumatic.      Right Ear: External ear normal.      Left Ear: External ear normal.      Nose: Nose normal.   Eyes:      General: Lids are normal.      Pupils: Pupils are equal, round, and reactive to light.   Neck:      Trachea: No tracheal deviation.   Cardiovascular:      Rate and Rhythm: Regular rhythm. Tachycardia present.      Pulses: No decreased pulses.      Heart sounds: Normal heart sounds. No murmur heard.     No friction rub. No gallop.   Pulmonary:      Effort: Pulmonary effort is normal. No respiratory distress.      Breath sounds: Normal breath sounds. No decreased breath sounds, wheezing, rhonchi or rales.   Abdominal:      General: Bowel sounds are normal.      Palpations: Abdomen is soft.      Tenderness: There is no abdominal tenderness. There is no guarding or rebound.   Musculoskeletal:         General: No deformity. Normal range of motion.      Cervical back: Normal range of motion and neck supple.   Lymphadenopathy:      Cervical: No cervical  adenopathy.   Skin:     General: Skin is warm and dry.      Findings: No rash.   Neurological:      Mental Status: She is oriented to person, place, and time. She is lethargic and confused.      GCS: GCS eye subscore is 4. GCS verbal subscore is 5. GCS motor subscore is 6.      Cranial Nerves: No cranial nerve deficit.      Sensory: No sensory deficit.      Comments: GCS 15, mildly confused, no focal weakness to the face, arms, or legs.    NIH stroke scale 0.   Psychiatric:         Speech: Speech normal.         Behavior: Behavior normal.         Thought Content: Thought content normal.         Judgment: Judgment normal.         Procedures           ED Course                                           Medical Decision Making  Differential diagnosis includes seizure, syncope episode, dehydration, sepsis, renal insufficiency, viral illness, electrolyte abnormality.    Lab evaluation shows normal kidney function with no significant electrolyte abnormalities, normal white count, nonconcerning H&H.    Viral respiratory panel is negative.    Lactic acid level was elevated consistent with suspected seizure.  Troponin, lipase, and ethanol level are all normal.    Chest x-ray and CT scan of the head without contrast showed no acute abnormalities.  The patient has remained well-appearing throughout the ER course.    I discussed the patient with the on-call neurologist, Dr. Tripathi.  She recommends EEG, MRI with and without contrast, and admission to the hospital.    Discussed the patient with the hospitalist, Dr. Mishra, who will consult on the patient to determine status of admission.    Problems Addressed:  Altered mental status, unspecified altered mental status type: complicated acute illness or injury with systemic symptoms    Amount and/or Complexity of Data Reviewed  External Data Reviewed: labs, radiology, ECG and notes.  Labs: ordered. Decision-making details documented in ED Course.  Radiology: ordered and independent  interpretation performed. Decision-making details documented in ED Course.  ECG/medicine tests: ordered and independent interpretation performed. Decision-making details documented in ED Course.     Details: EKG independently interpreted by myself shows sinus tachycardia without acute ischemic changes.    Risk  Prescription drug management.  Decision regarding hospitalization.        Final diagnoses:   Altered mental status, unspecified altered mental status type       ED Disposition  ED Disposition       ED Disposition   Decision to Admit    Condition   --    Comment   Level of Care: Telemetry [5]   Diagnosis: AMS (altered mental status) [[id number]]   Admitting Physician: KALA NICHOLSON [[id number]]   Attending Physician: KALA NICHOLSON [[id number]]                 No follow-up provider specified.       Medication List      No changes were made to your prescriptions during this visit.            Paco Pena MD  11/22/23 2056

## 2023-11-23 VITALS
HEART RATE: 79 BPM | OXYGEN SATURATION: 95 % | SYSTOLIC BLOOD PRESSURE: 143 MMHG | TEMPERATURE: 98.3 F | WEIGHT: 130 LBS | RESPIRATION RATE: 15 BRPM | HEIGHT: 65 IN | BODY MASS INDEX: 21.66 KG/M2 | DIASTOLIC BLOOD PRESSURE: 99 MMHG

## 2023-11-23 LAB
ANION GAP SERPL CALCULATED.3IONS-SCNC: 11 MMOL/L (ref 5–15)
BASOPHILS # BLD AUTO: 0.03 10*3/MM3 (ref 0–0.2)
BASOPHILS NFR BLD AUTO: 0.6 % (ref 0–1.5)
BUN SERPL-MCNC: 12 MG/DL (ref 8–23)
BUN/CREAT SERPL: 13.3 (ref 7–25)
CALCIUM SPEC-SCNC: 10.3 MG/DL (ref 8.6–10.5)
CHLORIDE SERPL-SCNC: 104 MMOL/L (ref 98–107)
CO2 SERPL-SCNC: 24 MMOL/L (ref 22–29)
CREAT SERPL-MCNC: 0.9 MG/DL (ref 0.57–1)
DEPRECATED RDW RBC AUTO: 46.4 FL (ref 37–54)
EGFRCR SERPLBLD CKD-EPI 2021: 71.5 ML/MIN/1.73
EOSINOPHIL # BLD AUTO: 0.19 10*3/MM3 (ref 0–0.4)
EOSINOPHIL NFR BLD AUTO: 3.5 % (ref 0.3–6.2)
ERYTHROCYTE [DISTWIDTH] IN BLOOD BY AUTOMATED COUNT: 12 % (ref 12.3–15.4)
GLUCOSE SERPL-MCNC: 95 MG/DL (ref 65–99)
HBA1C MFR BLD: 4.9 % (ref 4.8–5.6)
HCT VFR BLD AUTO: 29.8 % (ref 34–46.6)
HGB BLD-MCNC: 10.7 G/DL (ref 12–15.9)
IMM GRANULOCYTES # BLD AUTO: 0.06 10*3/MM3 (ref 0–0.05)
IMM GRANULOCYTES NFR BLD AUTO: 1.1 % (ref 0–0.5)
LYMPHOCYTES # BLD AUTO: 0.95 10*3/MM3 (ref 0.7–3.1)
LYMPHOCYTES NFR BLD AUTO: 17.7 % (ref 19.6–45.3)
MCH RBC QN AUTO: 37.5 PG (ref 26.6–33)
MCHC RBC AUTO-ENTMCNC: 35.9 G/DL (ref 31.5–35.7)
MCV RBC AUTO: 104.6 FL (ref 79–97)
MONOCYTES # BLD AUTO: 0.42 10*3/MM3 (ref 0.1–0.9)
MONOCYTES NFR BLD AUTO: 7.8 % (ref 5–12)
NEUTROPHILS NFR BLD AUTO: 3.72 10*3/MM3 (ref 1.7–7)
NEUTROPHILS NFR BLD AUTO: 69.3 % (ref 42.7–76)
NRBC BLD AUTO-RTO: 0 /100 WBC (ref 0–0.2)
PLATELET # BLD AUTO: 165 10*3/MM3 (ref 140–450)
PMV BLD AUTO: 9.8 FL (ref 6–12)
POTASSIUM SERPL-SCNC: 3.8 MMOL/L (ref 3.5–5.2)
RBC # BLD AUTO: 2.85 10*6/MM3 (ref 3.77–5.28)
SODIUM SERPL-SCNC: 139 MMOL/L (ref 136–145)
WBC NRBC COR # BLD AUTO: 5.37 10*3/MM3 (ref 3.4–10.8)

## 2023-11-23 PROCEDURE — 25010000002 LEVETRIRACETAM PER 10 MG: Performed by: STUDENT IN AN ORGANIZED HEALTH CARE EDUCATION/TRAINING PROGRAM

## 2023-11-23 PROCEDURE — 96374 THER/PROPH/DIAG INJ IV PUSH: CPT

## 2023-11-23 PROCEDURE — G0378 HOSPITAL OBSERVATION PER HR: HCPCS

## 2023-11-23 PROCEDURE — 85025 COMPLETE CBC W/AUTO DIFF WBC: CPT | Performed by: NURSE PRACTITIONER

## 2023-11-23 PROCEDURE — 99214 OFFICE O/P EST MOD 30 MIN: CPT | Performed by: STUDENT IN AN ORGANIZED HEALTH CARE EDUCATION/TRAINING PROGRAM

## 2023-11-23 PROCEDURE — 80048 BASIC METABOLIC PNL TOTAL CA: CPT | Performed by: NURSE PRACTITIONER

## 2023-11-23 PROCEDURE — 83036 HEMOGLOBIN GLYCOSYLATED A1C: CPT | Performed by: NURSE PRACTITIONER

## 2023-11-23 PROCEDURE — 96361 HYDRATE IV INFUSION ADD-ON: CPT

## 2023-11-23 PROCEDURE — 99239 HOSP IP/OBS DSCHRG MGMT >30: CPT | Performed by: INTERNAL MEDICINE

## 2023-11-23 RX ORDER — LEVETIRACETAM 500 MG/1
500 TABLET ORAL EVERY 12 HOURS SCHEDULED
Status: DISCONTINUED | OUTPATIENT
Start: 2023-11-23 | End: 2023-11-23 | Stop reason: HOSPADM

## 2023-11-23 RX ORDER — LEVETIRACETAM 500 MG/1
500 TABLET ORAL 2 TIMES DAILY
Qty: 60 TABLET | Refills: 2 | Status: CANCELLED | OUTPATIENT
Start: 2023-11-23 | End: 2023-12-23

## 2023-11-23 RX ORDER — LEVETIRACETAM 500 MG/1
500 TABLET ORAL EVERY 12 HOURS SCHEDULED
Qty: 60 TABLET | Refills: 1 | Status: SHIPPED | OUTPATIENT
Start: 2023-11-23 | End: 2024-01-22

## 2023-11-23 RX ORDER — LEVETIRACETAM 500 MG/5ML
1500 INJECTION, SOLUTION, CONCENTRATE INTRAVENOUS ONCE
Status: COMPLETED | OUTPATIENT
Start: 2023-11-23 | End: 2023-11-23

## 2023-11-23 RX ADMIN — Medication 1 PATCH: at 09:29

## 2023-11-23 RX ADMIN — LEVETIRACETAM 1500 MG: 100 INJECTION, SOLUTION INTRAVENOUS at 14:39

## 2023-11-23 RX ADMIN — Medication 10 ML: at 09:28

## 2023-11-23 RX ADMIN — ASPIRIN 81 MG CHEWABLE TABLET 81 MG: 81 TABLET CHEWABLE at 09:28

## 2023-11-23 RX ADMIN — ACETAMINOPHEN 650 MG: 325 TABLET ORAL at 09:32

## 2023-11-23 RX ADMIN — Medication 1 TABLET: at 09:28

## 2023-11-23 NOTE — DISCHARGE INSTRUCTIONS
The patient was given instructions regarding safety in persons with seizures, specifically including no driving for 90 days, no taking tub baths, no climbing heights or swimming due to risk of injury from seizures.

## 2023-11-23 NOTE — PLAN OF CARE
Problem: Adult Inpatient Plan of Care  Goal: Plan of Care Review  Outcome: Ongoing, Progressing  Goal: Patient-Specific Goal (Individualized)  Outcome: Ongoing, Progressing  Goal: Absence of Hospital-Acquired Illness or Injury  Outcome: Ongoing, Progressing  Intervention: Identify and Manage Fall Risk  Recent Flowsheet Documentation  Taken 11/23/2023 0600 by Lennie Prado, RN  Safety Promotion/Fall Prevention:   activity supervised   assistive device/personal items within reach   clutter free environment maintained   fall prevention program maintained   nonskid shoes/slippers when out of bed   room organization consistent   safety round/check completed   toileting scheduled  Taken 11/23/2023 0400 by Lennie Prado, RN  Safety Promotion/Fall Prevention:   activity supervised   assistive device/personal items within reach   clutter free environment maintained   fall prevention program maintained   nonskid shoes/slippers when out of bed   room organization consistent   safety round/check completed   toileting scheduled  Taken 11/23/2023 0200 by Lennie Prado, RN  Safety Promotion/Fall Prevention:   activity supervised   assistive device/personal items within reach   clutter free environment maintained   fall prevention program maintained   nonskid shoes/slippers when out of bed   room organization consistent   safety round/check completed   toileting scheduled  Taken 11/23/2023 0000 by Lennei Prado, RN  Safety Promotion/Fall Prevention:   activity supervised   assistive device/personal items within reach   clutter free environment maintained   fall prevention program maintained   nonskid shoes/slippers when out of bed   room organization consistent   safety round/check completed   toileting scheduled  Taken 11/22/2023 2238 by Lennie Prado, RN  Safety Promotion/Fall Prevention:   activity supervised   assistive device/personal items within reach   clutter free environment maintained   fall  prevention program maintained   nonskid shoes/slippers when out of bed   room organization consistent   safety round/check completed   toileting scheduled  Taken 11/22/2023 2045 by Lennie Prado, RN  Safety Promotion/Fall Prevention:   activity supervised   assistive device/personal items within reach   clutter free environment maintained   fall prevention program maintained   nonskid shoes/slippers when out of bed   room organization consistent   safety round/check completed   toileting scheduled  Intervention: Prevent Skin Injury  Recent Flowsheet Documentation  Taken 11/23/2023 0600 by Lennie Prado, RN  Body Position: position changed independently  Taken 11/23/2023 0400 by Lennie Prado, RN  Body Position: position changed independently  Taken 11/23/2023 0200 by Lennie Prado, RN  Body Position: position changed independently  Taken 11/23/2023 0000 by Lennie Prado, RN  Body Position: position changed independently  Taken 11/22/2023 2238 by Lennie Prado, RN  Body Position: position changed independently  Taken 11/22/2023 2045 by Lennie Prado, RN  Body Position: position changed independently  Skin Protection:   adhesive use limited   tubing/devices free from skin contact  Intervention: Prevent and Manage VTE (Venous Thromboembolism) Risk  Recent Flowsheet Documentation  Taken 11/23/2023 0600 by Lennie Prado, RN  Activity Management: activity encouraged  Taken 11/23/2023 0400 by Lennie Prado, RN  Activity Management: activity encouraged  Taken 11/23/2023 0200 by Lennie Prado, RN  Activity Management: activity encouraged  Taken 11/23/2023 0000 by Lennie Prado, RN  Activity Management: activity encouraged  Taken 11/22/2023 2238 by Lennie Prado, RN  Activity Management: activity encouraged  Taken 11/22/2023 2045 by Lennie Prado, RN  Activity Management: activity encouraged  VTE Prevention/Management:   bilateral   sequential compression devices off    patient refused intervention  Intervention: Prevent Infection  Recent Flowsheet Documentation  Taken 11/23/2023 0600 by Lennie Prado, RN  Infection Prevention:   environmental surveillance performed   hand hygiene promoted   rest/sleep promoted   single patient room provided  Taken 11/23/2023 0400 by Lennie Prado RN  Infection Prevention:   environmental surveillance performed   hand hygiene promoted   rest/sleep promoted   single patient room provided  Taken 11/23/2023 0200 by Lennie Prado, RN  Infection Prevention:   environmental surveillance performed   hand hygiene promoted   rest/sleep promoted   single patient room provided  Taken 11/23/2023 0000 by Lennie Prado RN  Infection Prevention:   environmental surveillance performed   hand hygiene promoted   rest/sleep promoted   single patient room provided  Taken 11/22/2023 2238 by Lennie Prado RN  Infection Prevention:   environmental surveillance performed   hand hygiene promoted   rest/sleep promoted   single patient room provided  Taken 11/22/2023 2045 by Lennie Prado, RN  Infection Prevention:   environmental surveillance performed   hand hygiene promoted   rest/sleep promoted   single patient room provided  Goal: Optimal Comfort and Wellbeing  Outcome: Ongoing, Progressing  Intervention: Provide Person-Centered Care  Recent Flowsheet Documentation  Taken 11/22/2023 2045 by Lennie Prado RN  Trust Relationship/Rapport:   care explained   thoughts/feelings acknowledged   reassurance provided   questions encouraged  Goal: Readiness for Transition of Care  Outcome: Ongoing, Progressing     Problem: Asthma Comorbidity  Goal: Maintenance of Asthma Control  Outcome: Ongoing, Progressing  Intervention: Maintain Asthma Symptom Control  Recent Flowsheet Documentation  Taken 11/22/2023 2045 by Lennie Prado RN  Medication Review/Management: medications reviewed     Problem: Behavioral Health Comorbidity  Goal: Maintenance of  Behavioral Health Symptom Control  Outcome: Ongoing, Progressing  Intervention: Maintain Behavioral Health Symptom Control  Recent Flowsheet Documentation  Taken 11/22/2023 2045 by Lennie Prado, RN  Medication Review/Management: medications reviewed     Problem: COPD (Chronic Obstructive Pulmonary Disease) Comorbidity  Goal: Maintenance of COPD Symptom Control  Outcome: Ongoing, Progressing  Intervention: Maintain COPD-Symptom Control  Recent Flowsheet Documentation  Taken 11/22/2023 2045 by Lennie Prado, RN  Medication Review/Management: medications reviewed     Problem: Diabetes Comorbidity  Goal: Blood Glucose Level Within Targeted Range  Outcome: Ongoing, Progressing     Problem: Heart Failure Comorbidity  Goal: Maintenance of Heart Failure Symptom Control  Outcome: Ongoing, Progressing  Intervention: Maintain Heart Failure-Management  Recent Flowsheet Documentation  Taken 11/22/2023 2045 by Lennie Prado, RN  Medication Review/Management: medications reviewed     Problem: Hypertension Comorbidity  Goal: Blood Pressure in Desired Range  Outcome: Ongoing, Progressing  Intervention: Maintain Blood Pressure Management  Recent Flowsheet Documentation  Taken 11/22/2023 2045 by Lennie Prado, RN  Medication Review/Management: medications reviewed     Problem: Obstructive Sleep Apnea Risk or Actual Comorbidity Management  Goal: Unobstructed Breathing During Sleep  Outcome: Ongoing, Progressing     Problem: Osteoarthritis Comorbidity  Goal: Maintenance of Osteoarthritis Symptom Control  Outcome: Ongoing, Progressing  Intervention: Maintain Osteoarthritis Symptom Control  Recent Flowsheet Documentation  Taken 11/23/2023 0600 by Lennie Prado, RN  Activity Management: activity encouraged  Taken 11/23/2023 0400 by Lennie Prado, RN  Activity Management: activity encouraged  Taken 11/23/2023 0200 by Lennie Prado, RN  Activity Management: activity encouraged  Taken 11/23/2023 0000 by Johan  Lennie WALSH, RN  Activity Management: activity encouraged  Taken 11/22/2023 2238 by Lennie Prado, RN  Activity Management: activity encouraged  Taken 11/22/2023 2045 by Lennie Prado, RN  Activity Management: activity encouraged  Medication Review/Management: medications reviewed     Problem: Pain Chronic (Persistent) (Comorbidity Management)  Goal: Acceptable Pain Control and Functional Ability  Outcome: Ongoing, Progressing  Intervention: Manage Persistent Pain  Recent Flowsheet Documentation  Taken 11/22/2023 2045 by Lennie Prado, RN  Medication Review/Management: medications reviewed  Intervention: Optimize Psychosocial Wellbeing  Recent Flowsheet Documentation  Taken 11/22/2023 2045 by Lennie Prado, RN  Diversional Activities:   television   smartphone  Family/Support System Care:   support provided   self-care encouraged   involvement promoted     Problem: Seizure Disorder Comorbidity  Goal: Maintenance of Seizure Control  Outcome: Ongoing, Progressing  Intervention: Maintain Seizure-Symptom Control  Recent Flowsheet Documentation  Taken 11/23/2023 0600 by Lennie Prado, RN  Seizure Precautions:   clutter-free environment maintained   side rails padded   activity supervised  Taken 11/23/2023 0400 by Lennie Prado, RN  Seizure Precautions:   clutter-free environment maintained   side rails padded   activity supervised  Taken 11/23/2023 0200 by Lennie Prado, RN  Seizure Precautions:   clutter-free environment maintained   side rails padded   activity supervised  Taken 11/23/2023 0000 by Lennie Prado, RN  Seizure Precautions:   clutter-free environment maintained   side rails padded   activity supervised  Taken 11/22/2023 2238 by Lennie Prado, RN  Seizure Precautions:   clutter-free environment maintained   side rails padded   activity supervised  Taken 11/22/2023 2045 by Lennie Prado, RN  Seizure Precautions:   clutter-free environment maintained   activity  supervised   side rails padded   Goal Outcome Evaluation:

## 2023-11-23 NOTE — DISCHARGE SUMMARY
"    [facility] Medicine Services  DISCHARGE SUMMARY    Patient Name: Mirna Godinez  : 1960  MRN: [id number]    Date of Admission: 2023  2:00 PM  Date of Discharge: 2023  Primary Care Physician: Lizett Anguiano APRN    Consults       Date and Time Order Name Status Description    2023  6:01 PM Inpatient Neurology Consult General Completed             Hospital Course     Presenting Problem: Seizure    Active Hospital Problems    Diagnosis  POA   • **AMS (altered mental status) [R41.82]  Yes   • Observed seizure-like activity [R56.9]  Yes   • Hypothyroidism (acquired) [E03.9]  Yes   • Anxiety associated with depression [F41.8]  Yes   • Primary hypertension [I10]  Yes   • HLD (hyperlipidemia) [E78.5]  Yes   • Elevated lactic acid level [R79.89]  Yes   • SURI (acute kidney injury) [N17.9]  Yes      Resolved Hospital Problems   No resolved problems to display.          Hospital Course:  Mirna Godinez is a 63 y.o. female with past medical history significant for hypothyroidism, HTN, anxiety, depression.  Patient has been in normal state of health.  Had a witnessed episode lasting 1-2 minutes of staring, \"shaking\", and questionable foaming at the mouth while at work on day of admission.  Noted to have urinated on herself when EMS arrived.     Initial lactate of 4.4, reflex 1.6.  CT head with no acute finding.         AMS   seizure like activity/\"Spell\"  --Wittnessed seizure-like activity lasting 1-2 minutes at 1325 today.  Primarily staring, then shaking, nonverbal, questionable foaming at the mouth.  --Loaded with IV Keppra, continue Keppra 500 mg twice daily  --Was unable to have a EEG due to holiday, but has outpatient EEG arranged per neurology    --CT head with no acute findings  --Respiratory viral panel negative.  -- MRI brain without acute infarct, outpatient EEG planned  --Admits to drinking alcohol nightly, UDS negative    Mild SURI, resolved at DC  --Likely " prerenal       Hypothyroidism  --Takes Synthroid.  -- TSH okay    Anxiety/depression  -- Takes paroxetine.          Discharge Follow Up Recommendations for outpatient labs/diagnostics:  PCP in 1 week  Outpatient neurology as scheduled    Day of Discharge     HPI:   Feeling better, has some difficulty because she is hard of hearing and has to read lips    Review of Systems  Gen- No fevers, chills  CV- No chest pain, palpitations  Resp- No cough, dyspnea  GI- No N/V/D, abd pain      Vital Signs:   Temp:  [98 °F (36.7 °C)-98.7 °F (37.1 °C)] 98.3 °F (36.8 °C)  Heart Rate:  [] 79  Resp:  [15-20] 15  BP: (127-159)/(64-99) 143/99  Flow (L/min):  [1] 1      Physical Exam:  Constitutional: No acute distress, awake, alert  HENT: NCAT, mucous membranes moist  Respiratory: Respiratory effort normal   Cardiovascular: RRR, no murmurs  Musculoskeletal: No bilateral ankle edema  Psychiatric: Appropriate affect, cooperative  Neurologic: Oriented x 3, speech clear  Skin: No rashes      Pertinent  and/or Most Recent Results     LAB RESULTS:      Lab 11/23/23  0419 11/22/23  1726 11/22/23  1450   WBC 5.37  --  5.68   HEMOGLOBIN 10.7*  --  12.1   HEMATOCRIT 29.8*  --  33.7*   PLATELETS 165  --  200   NEUTROS ABS 3.72  --  4.56   IMMATURE GRANS (ABS) 0.06*  --  0.03   LYMPHS ABS 0.95  --  0.57*   MONOS ABS 0.42  --  0.29   EOS ABS 0.19  --  0.19   .6*  --  104.3*   LACTATE  --  1.6 4.4*         Lab 11/23/23  0419 11/22/23  1726 11/22/23  1450   SODIUM 139  --  136   POTASSIUM 3.8  --  3.8   CHLORIDE 104  --  95*   CO2 24.0  --  28.0   ANION GAP 11.0  --  13.0   BUN 12  --  16   CREATININE 0.90  --  1.10*   EGFR 71.5  --  56.2*   GLUCOSE 95  --  176*   CALCIUM 10.3  --  10.7*   HEMOGLOBIN A1C 4.90  --   --    TSH  --  1.160  --          Lab 11/22/23  1450   TOTAL PROTEIN 7.1   ALBUMIN 4.7   GLOBULIN 2.4   ALT (SGPT) 85*   AST (SGOT) 85*   BILIRUBIN 0.5   ALK PHOS 48   LIPASE 59         Lab 11/22/23  1726 11/22/23  1450    HSTROP T <6 <6                 Brief Urine Lab Results  (Last result in the past 365 days)        Color   Clarity   Blood   Leuk Est   Nitrite   Protein   CREAT   Urine HCG        11/22/23 1624 Yellow   Hazy   Negative   Negative   Negative   Negative                 Microbiology Results (last 10 days)       Procedure Component Value - Date/Time    COVID PRE-OP / PRE-PROCEDURE SCREENING ORDER (NO ISOLATION) - Swab, Nasopharynx [[id number]]  (Normal) Collected: 11/22/23 1439    Lab Status: Final result Specimen: Swab from Nasopharynx Updated: 11/22/23 1546    Narrative:      The following orders were created for panel order COVID PRE-OP / PRE-PROCEDURE SCREENING ORDER (NO ISOLATION) - Swab, Nasopharynx.  Procedure                               Abnormality         Status                     ---------                               -----------         ------                     Respiratory Panel PCR w/...[[id number]]  Normal              Final result                 Please view results for these tests on the individual orders.    Respiratory Panel PCR w/COVID-19(SARS-CoV-2) TRAE/GORDO/JANIE/PAD/COR/VANNESSA In-House, NP Swab in UTM/VTM, 2 HR TAT - Swab, Nasopharynx [[id number]]  (Normal) Collected: 11/22/23 1439    Lab Status: Final result Specimen: Swab from Nasopharynx Updated: 11/22/23 1546     ADENOVIRUS, PCR Not Detected     Coronavirus 229E Not Detected     Coronavirus HKU1 Not Detected     Coronavirus NL63 Not Detected     Coronavirus OC43 Not Detected     COVID19 Not Detected     Human Metapneumovirus Not Detected     Human Rhinovirus/Enterovirus Not Detected     Influenza A PCR Not Detected     Influenza B PCR Not Detected     Parainfluenza Virus 1 Not Detected     Parainfluenza Virus 2 Not Detected     Parainfluenza Virus 3 Not Detected     Parainfluenza Virus 4 Not Detected     RSV, PCR Not Detected     Bordetella pertussis pcr Not Detected     Bordetella parapertussis PCR Not Detected     Chlamydophila pneumoniae PCR  Not Detected     Mycoplasma pneumo by PCR Not Detected    Narrative:      In the setting of a positive respiratory panel with a viral infection PLUS a negative procalcitonin without other underlying concern for bacterial infection, consider observing off antibiotics or discontinuation of antibiotics and continue supportive care. If the respiratory panel is positive for atypical bacterial infection (Bordetella pertussis, Chlamydophila pneumoniae, or Mycoplasma pneumoniae), consider antibiotic de-escalation to target atypical bacterial infection.            MRI Brain With & Without Contrast    Result Date: 11/22/2023  MRI BRAIN W WO CONTRAST Date of Exam: 11/22/2023 10:17 PM EST Indication: AMS, seizure like activity.  Comparison: CT head 11/22/2023 Technique:  Routine multiplanar/multisequence sequence images of the brain were obtained before and after the uneventful administration of 12 cc Multihance. Findings: No acute infarct. There is no evidence of hemorrhage. No mass effect, edema or midline shift No evidence of mesial temporal sclerosis. No evidence of gray-white matter heterotopia. No abnormal intracranial enhancement. Scattered periventricular and subcortical T2/FLAIR hyperintensities, nonspecific but most likely represents chronic small vessel ischemic changes. No extra-axial fluid collection. Partially empty and mildly expanded sella, nonspecific. Prominent ventricular system secondary to chronic parenchymal volume loss. Status post bilateral lens extraction. Otherwise the orbits are unremarkable. Mild mucosal thickening of the maxillary sinuses and ethmoid sinuses. The remainder of the paranasal sinuses and mastoid air cells are clear. Decreased volume of right maxillary sinus. The visualized soft tissues are unremarkable. No acute osseous abnormality.     Impression: No acute intracranial abnormality. No MRI findings to suggest an underlying etiology for patient's symptoms. Scattered periventricular and  subcortical T2/FLAIR hyperintensities, nonspecific but most likely represents chronic small vessel ischemic changes. Partially empty and mildly expanded sella, nonspecific. No other findings to suggest underlying intracranial hypertension. Electronically Signed: Andrea Dennis DO  11/22/2023 10:46 PM EST  Workstation ID: [id number]    CT Head Without Contrast    Result Date: 11/22/2023  CT HEAD WO CONTRAST Date of Exam: 11/22/2023 3:33 PM EST Indication: confusion/seizure. Comparison: None available. Technique: Axial CT images were obtained of the head without contrast administration.  Automated exposure control and iterative construction methods were used. FINDINGS: Gray-white differentiation is maintained and there is no evidence of intracranial hemorrhage, mass or mass effect. Age-related changes of the brain are present including volume loss and typical periventricular sequela of chronic small vessel ischemia. There is otherwise no evidence of intracranial hemorrhage, mass or mass effect. The ventricles are normal in size and configuration accounting for surrounding volume loss. The orbits are normal and the paranasal sinuses demonstrate changes of chronic right maxillary sinusitis.     Age-related changes of the brain as above, otherwise without evidence of acute intracranial abnormality. Electronically Signed: Abel Sánchez MD  11/22/2023 3:42 PM EST  Workstation ID: [id number]    XR Chest 1 View    Result Date: 11/22/2023  XR CHEST 1 VW Date of Exam: 11/22/2023 2:26 PM EST Indication: altered mental status Comparison: 5/12/2016. Findings: Heart and pulmonary vessels appear within normal limits for technique. There is a somewhat poor inspiration with crowding of vascular markings. No acute lung infiltrates or effusions are present.     Impression: No acute process. Electronically Signed: Libra Quinn MD  11/22/2023 2:37 PM EST  Workstation ID: [id number]                 Plan for Follow-up of Pending  Labs/Results:     Discharge Details        Discharge Medications        New Medications        Instructions Start Date   levETIRAcetam 500 MG tablet  Commonly known as: KEPPRA   500 mg, Oral, Every 12 Hours Scheduled             Continue These Medications        Instructions Start Date   aspirin 81 MG chewable tablet   81 mg, Oral, Daily      multivitamin with minerals tablet tablet   1 tablet, Oral, Daily             Stop These Medications      cetirizine 10 MG tablet  Commonly known as: zyrTEC     LISINOPRIL PO     oxyCODONE-acetaminophen 5-325 MG per tablet  Commonly known as: PERCOCET     PAROXETINE HCL PO              Allergies   Allergen Reactions   • Ceclor [Cefaclor] Hives         Discharge Disposition:  Home or Self Care    Diet:  Hospital:  Diet Order   Procedures   • Diet: Regular/House Diet; Texture: Regular Texture (IDDSI 7); Fluid Consistency: Thin (IDDSI 0)            Activity:      Restrictions or Other Recommendations:  No driving       CODE STATUS:    Code Status and Medical Interventions:   Ordered at: 11/22/23 9139     Level Of Support Discussed With:    Patient     Code Status (Patient has no pulse and is not breathing):    CPR (Attempt to Resuscitate)     Medical Interventions (Patient has pulse or is breathing):    Full Support       No future appointments.    Additional Instructions for the Follow-ups that You Need to Schedule       Discharge Follow-up with PCP   As directed       Currently Documented PCP:    Lizett Anguiano APRN    PCP Phone Number:    [phone]     Follow Up Details: 1 week        Discharge Follow-up with Specified Provider: neurology clinic; 1 Month   As directed      To: neurology clinic   Follow Up: 1 Month                    Cyndi Lopez DO  11/23/23      Time Spent on Discharge:  I spent  36  minutes on this discharge activity which included: face-to-face encounter with the patient, reviewing the data in the system, coordination of the care with the  nursing staff as well as consultants, documentation, and entering orders.

## 2023-11-23 NOTE — PROGRESS NOTES
"[facility] Neurology  Consult Note    Patient Name: Mirna Godinez  : 1960  MRN: [id number]  Primary Care Physician:  Lizett Anguiano APRN  Referring Physician: No ref. provider found  Date of admission: 2023    Subjective     Reason for Consult: Seizure    Mirna Godinez is a 63 y.o. female with history of hypothyroid, hypertension, anxiety, depression who is presenting with seizure and altered mental status.    Patient was brought in via EMS for evaluation of possible seizure and altered mental status.  She was at work when she had an episode described as full body shaking for about 1 minute. Associated with urinary incontinence. Episode resolved without any intervention.    In the ER she was alert and oriented x 4 but slow to respond and admitted she was confused.  Initial lactate 4.4.  She is afebrile.  CT head without acute finding.      Patient is amnestic to the event but does remember prior to loss of consciousness she remembers being profusely diaphoretic, tachycardic, and feeling very hot.  She remembers attempting to take off her shirt before she lost consciousness.  Her assistant witnessed the event and reported flexion at the elbows and some shaking.    Has been at bedside reports she was not responding until she got to the hospital.    She reports 2 other events, 1 other episode with severe diaphoresis, tachycardia that did not progress any further loss of consciousness.  She did have another \"smaller\" event with diaphoresis and tachycardia the night before admission.    She denies family history of seizures, CNS infection, head trauma.  She vapes, does not do recreational drugs, she drinks alcohol nightly, 1-2 drinks.     Review Of Systems   Constitutional:   Cardiovascular: Negative for chest pain or palpitations.  Respiratory: Negative for shortness of breath or cough.  Gastrointestinal: Negative for nausea and vomiting.  Genitourinary: Negative for bladder " incontinence.  Musculoskeletal: Negative for aches and pains in the muscles or joints.  Dermatological: Negative for skin breakdown.   Neurological: Negative for headache, pain, weakness or vision changes.     Personal History     Past Medical History:   Diagnosis Date    Anxiety associated with depression 11/22/2023    HLD (hyperlipidemia) 11/22/2023    Hypothyroidism (acquired) 11/22/2023    Primary hypertension 11/22/2023       Past Surgical History:   Procedure Laterality Date    HYSTERECTOMY      KNEE ARTHROSCOPY Bilateral        Family History: family history includes Bipolar disorder in her mother; Diabetes in her father; Heart disease in her father; No Known Problems in her daughter. Otherwise pertinent FHx was reviewed and not pertinent to current issue.    Social History:  reports that she quit smoking about 2 years ago. Her smoking use included cigarettes. She has never used smokeless tobacco. She reports current alcohol use of about 7.0 standard drinks of alcohol per week. She reports that she does not use drugs.    Home Medications:   Lisinopril, PARoxetine HCl, aspirin, cetirizine, multivitamin with minerals, and oxyCODONE-acetaminophen    Current Medications:     Current Facility-Administered Medications:     acetaminophen (TYLENOL) tablet 650 mg, 650 mg, Oral, Q4H PRN, 650 mg at 11/22/23 2230 **OR** acetaminophen (TYLENOL) 160 MG/5ML oral solution 650 mg, 650 mg, Oral, Q4H PRN **OR** acetaminophen (TYLENOL) suppository 650 mg, 650 mg, Rectal, Q4H PRN, Violeta Briscoe, APRN    aspirin chewable tablet 81 mg, 81 mg, Oral, Daily, Violeta Briscoe, APRN    sennosides-docusate (PERICOLACE) 8.6-50 MG per tablet 1 tablet, 1 tablet, Oral, BID **AND** polyethylene glycol (MIRALAX) packet 17 g, 17 g, Oral, Daily PRN **AND** bisacodyl (DULCOLAX) EC tablet 5 mg, 5 mg, Oral, Daily PRN **AND** bisacodyl (DULCOLAX) suppository 10 mg, 10 mg, Rectal, Daily PRN, Violeta Briscoe, APRN     influenza vac split quad (FLUZONE,FLUARIX,AFLURIA,FLULAVAL) injection 0.5 mL, 0.5 mL, Intramuscular, During Hospitalization, Kellie Mishra MD    midazolam (VERSED) injection 2.5 mg, 2.5 mg, Intravenous, Q4H PRN, Kellie Mishra MD    multivitamin with minerals 1 tablet, 1 tablet, Oral, Daily, Violeta Briscoe APRN    nicotine (NICODERM CQ) 21 MG/24HR patch 1 patch, 1 patch, Transdermal, Q24H, Violeta Briscoe APRN, 1 patch at 11/22/23 1829    [COMPLETED] Insert Peripheral IV, , , Once **AND** sodium chloride 0.9 % flush 10 mL, 10 mL, Intravenous, PRN, Paco Pena MD    sodium chloride 0.9 % flush 10 mL, 10 mL, Intravenous, Q12H, Violeta Briscoe APRN    sodium chloride 0.9 % flush 10 mL, 10 mL, Intravenous, PRN, Violeta Briscoe APRN    sodium chloride 0.9 % infusion 40 mL, 40 mL, Intravenous, PRN, Violeta Briscoe APRN     Allergies:  Allergies   Allergen Reactions    Ceclor [Cefaclor] Hives       Objective     Vitals:  Temp:  [98 °F (36.7 °C)-98.7 °F (37.1 °C)] 98 °F (36.7 °C)  Heart Rate:  [] 89  Resp:  [17-20] 17  BP: (127-159)/(64-92) 127/87  Flow (L/min):  [1] 1    Neurologic Exam   Mental status/Cognition: alert; oriented to person, place, year, and month; good attention, responds appropriately; Normal mood and affect. Normal insight into condition.  Speech/language: fluent; comprehension intact    Cranial nerves:    CN II Visual fields full to confrontation.    CN III,IV,VI PERRL. EOMI.  No nystagmus noted.    CN V Facial sensation intact to light touch bilaterally in V1, V2, V3   CN VII Face, Smile, Eyebrow raise symmetric. Eye closure strength 5/5 b/l   CN VIII Hearing intact to finger rub bilaterally   CN IX & X Soft palate elevates symmetrically in the midline, no dysarthria   CN XI Shoulder shrug, SCM 5/5 strength bilaterally   CN XII Tongue protrudes midline     Motor: Normal bulk. Normal tone. No pronator drift.    Right Left  "Comments   NF     SA 5 5    EE 5 5    EF 5 5    WF      WE      FF      IO 5 5    HF 5 5    KE      KF      DF 5 5    PF 5 5      Sensation:   Light Touch Intact throughout   Pin Prick    Vibration    Temperature    Proprioception        Reflexes:   Right Left Comments   Biceps 1 1    Triceps      Brachioradialis 1 1    Patellar 1 1    Achilles 1 1    Diana      Plantar      Jaw Jerk       Coordination/Complex Motor:   - Finger-to-nose intact bilaterally without dysmetria      Laboratory Results:   Lab Results   Component Value Date    GLUCOSE 95 11/23/2023    CALCIUM 10.3 11/23/2023     11/23/2023    K 3.8 11/23/2023    CO2 24.0 11/23/2023     11/23/2023    BUN 12 11/23/2023    CREATININE 0.90 11/23/2023    EGFRIFNONA 84 05/23/2021    BCR 13.3 11/23/2023    ANIONGAP 11.0 11/23/2023     Lab Results   Component Value Date    WBC 5.37 11/23/2023    HGB 10.7 (L) 11/23/2023    HCT 29.8 (L) 11/23/2023    .6 (H) 11/23/2023     11/23/2023     No results found for: \"CHOL\"  No results found for: \"HDL\"  No results found for: \"LDL\"  No results found for: \"TRIG\"  Lab Results   Component Value Date    HGBA1C 4.90 11/23/2023     Lab Results   Component Value Date    INR 0.89 05/12/2016    PROTIME 9.7 05/12/2016     No results found for: \"[id number]\"  No results found for: \"FOLATE\"      Images/Procedures   CT head 11/22/2023  Typical volume loss and periventricular chronic small vessel ischemia.      MRI brain with and without contrast 11/22/2023  Acute intracranial abnormality.  Scattered periventricular and subcortical T2 flair hyperintensities likely chronic small vessel ischemic changes.    Assessment / Plan   Active Hospital Problems:  New onset seizure    Brief Patient Summary:  Mirna Godinez is a 63 y.o. female with history of hypothyroid, hypertension, anxiety, depression who is presenting with seizure and altered mental status.  Her exam is intact, and she appears to be back to baseline.  CT " head was unremarkable.  MRI brain shows typical age-related changes, likely small vessel ischemic disease; no epileptic genic focus.  EEG is pending and will be done as an outpatient.    Her semiology is concerning for seizure.  Would recommend starting antiseizure medications at this time.  Seizure precautions were discussed and patient verbalized understanding.    No further workup while inpatient, patient is okay to discharge after IV load of Keppra.    Plan:   - Load with IV keppra 1500mg once  - Start keppra 500mg BID tonight   -Follow-up with general neurology in the clinic, in 1 to 2 months  - Patient okay to discharge from a neurology perspective    The patient was given instructions regarding safety in persons with seizures, specifically including no driving for 90 days, no taking tub baths, no climbing heights or swimming due to risk of injury from seizures.      I have discussed the above with the patient, family, hospitalist  Time spent with patient: 60 minutes in face-to-face evaluation and management of the patient.       Bobby Tripathi, DO

## 2023-11-23 NOTE — CASE MANAGEMENT/SOCIAL WORK
Discharge Planning Assessment  [facility]     Patient Name: Mirna Godinez  MRN: [id number]  Today's Date: 11/23/2023    Admit Date: 11/22/2023    Plan: Home with 's assistance                   Discharge Plan       Row Name 11/23/23 1326       Plan    Plan Home with 's assistance    Patient/Family in Agreement with Plan yes    Plan Comments Called Ms. Godinez by phone, for discharge planning.  Ms. Godinez lives with her  in [facility].  She denies any DME or home health needs.    PCP is Lizett Anguiano.  Insurance is [email or web address] with no interruption in coverage.    DC plan is to return home with her 's assistance, as needed.    Ms. Godinez's  is transporting her home at discharge.    Final Discharge Disposition Code 01 - home or self-care                         Expected Discharge Date and Time       Expected Discharge Date Expected Discharge Time    Nov 23, 2023                   Jamilah Burgess RN

## 2023-11-24 LAB
QT INTERVAL: 326 MS
QTC INTERVAL: 430 MS